# Patient Record
Sex: FEMALE | URBAN - METROPOLITAN AREA
[De-identification: names, ages, dates, MRNs, and addresses within clinical notes are randomized per-mention and may not be internally consistent; named-entity substitution may affect disease eponyms.]

---

## 2024-10-18 ENCOUNTER — TELEPHONE (OUTPATIENT)
Dept: GASTROENTEROLOGY | Facility: CLINIC | Age: 79
End: 2024-10-18
Payer: MEDICARE

## 2024-10-18 NOTE — TELEPHONE ENCOUNTER
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 Referral/records are scanned in media mgr. Please contact patient to schedule.         Thanks

## 2024-10-21 ENCOUNTER — TELEPHONE (OUTPATIENT)
Dept: ENDOSCOPY | Facility: HOSPITAL | Age: 79
End: 2024-10-21
Payer: MEDICARE

## 2024-10-21 ENCOUNTER — TELEPHONE (OUTPATIENT)
Dept: GASTROENTEROLOGY | Facility: CLINIC | Age: 79
End: 2024-10-21
Payer: MEDICARE

## 2024-10-21 NOTE — TELEPHONE ENCOUNTER
----- Message from Jennifer sent at 10/21/2024 12:14 PM CDT -----  Regarding: Appointments        Name Of Caller:    Alisson PETE      Contact Preference:   [phone]      Nature of call:  Requesting the office to contact pt's daughter, Amy Lawton about scheduling their NP appt. Please call phone #: [phone].

## 2024-10-25 ENCOUNTER — TELEPHONE (OUTPATIENT)
Dept: GASTROENTEROLOGY | Facility: CLINIC | Age: 79
End: 2024-10-25
Payer: MEDICARE

## 2024-10-25 NOTE — TELEPHONE ENCOUNTER
----- Message from Birdie sent at 10/25/2024  9:04 AM CDT -----  Regarding: Consult/Advisory  Contact: Alisson @ [phone]  Consult/Advisory     Name Of Caller: Alisson (Dr. Gannon)      Contact Preference: [phone]     Nature of call:calling to get office notes once pt is seen.

## 2024-10-28 ENCOUNTER — TELEPHONE (OUTPATIENT)
Dept: ENDOSCOPY | Facility: HOSPITAL | Age: 79
End: 2024-10-28
Payer: MEDICARE

## 2024-11-05 ENCOUNTER — TELEPHONE (OUTPATIENT)
Dept: GASTROENTEROLOGY | Facility: CLINIC | Age: 79
End: 2024-11-05
Payer: MEDICARE

## 2024-11-05 NOTE — TELEPHONE ENCOUNTER
----- Message from Missael Jamison PA-C sent at 11/4/2024  3:12 PM CST -----  Yes, I am ok with seeing EMR patients  ----- Message -----  From: Val Cline MD  Sent: 11/4/2024   3:02 PM CST  To: Georgi Chopra MA; Missael Jamison PA-C; #    Could be ok, Missael have you been comfortable reviewing EMRs with patients? Main things to emphasize in the duodenal location is the thin and vascular small bowel wall so bleeding and perforation are of slightly higher concern though overall fairly low risk (just higher risk than the usual low risk with a non-interventional procedure).  ----- Message -----  From: Georgi Chopra MA  Sent: 11/1/2024   8:25 AM CST  To: Val Cline MD; #     Joy Renae MA     Could this be with Missael?        Per Dr. Cline review, Clinic visit to discuss duodenal adenoma EMR in next 2-3 weeks.  Message forward to [facility]astro Clinical Staff for scheduling.

## 2024-11-07 ENCOUNTER — TELEPHONE (OUTPATIENT)
Dept: GASTROENTEROLOGY | Facility: CLINIC | Age: 79
End: 2024-11-07
Payer: MEDICARE

## 2024-11-07 NOTE — TELEPHONE ENCOUNTER
Spoke with Amy.  She will sent up [facility] and will call back to schedule virtual visit with GAEL Sorensen.

## 2024-11-07 NOTE — TELEPHONE ENCOUNTER
----- Message from Lance sent at 11/7/2024  8:08 AM CST -----  Regarding: Callback  Contact: Amy/daughter [phone]  Patient's daughter Amy calling requesting a callback from Joy Renae MA in the office in regards to questions about the consult that was discussed with her mother. Please call back as soon as possible.

## 2024-11-11 ENCOUNTER — TELEPHONE (OUTPATIENT)
Dept: GASTROENTEROLOGY | Facility: CLINIC | Age: 79
End: 2024-11-11
Payer: MEDICARE

## 2024-11-11 NOTE — TELEPHONE ENCOUNTER
----- Message from Svitlana sent at 11/11/2024  3:45 PM CST -----  Pt daughter calling to schedule a virtual ab for pt , says pt needs a procedure done and has to have her virtual before it can be scheduled      Call back [phone] Amy daughter

## 2024-11-13 ENCOUNTER — OFFICE VISIT (OUTPATIENT)
Dept: GASTROENTEROLOGY | Facility: CLINIC | Age: 79
End: 2024-11-13
Payer: MEDICARE

## 2024-11-13 DIAGNOSIS — D13.2 DUODENAL ADENOMA: Primary | ICD-10-CM

## 2024-11-13 PROCEDURE — 99213 OFFICE O/P EST LOW 20 MIN: CPT | Mod: 95,,,

## 2024-11-13 NOTE — PROGRESS NOTES
[facility]  The patient location is: [place]  The chief complaint leading to consultation is: EMR discussion  Visit type: Virtual visit with synchronous audio and video  Total time spent with patient: 20    Each patient to whom he or she provides medical services by telemedicine is:  (1) informed of the relationship between the physician and patient and the respective role of any other health care provider with respect to management of the patient; and (2) notified that he or she may decline to receive medical services by telemedicine and may withdraw from such care at any time.       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    PCP:   Anne Jamison.         Initial HPI   This is a 79 y.o. female presenting for discussion of EMR of duodenal polyp. She underwent EGD in [place] on 10/11/2024 which demonstrated a 2cm polyp in the second part of the duodenum located along a fold. Biopsies were taken and pathology showed it to be an adenoma. She was referred to [facility] for endoscopic mucosal resection of this polyp. She is on virtual visit today to discuss the risks/benefits of this procedure and have any questions answered . Denies NSAID use. Not on blood thinners        ROS:  Review of Systems   Constitutional:  Negative for chills, diaphoresis, fever and weight loss.   Gastrointestinal:  Negative for abdominal pain, blood in stool, constipation, diarrhea, heartburn, melena, nausea and vomiting.   Skin:  Negative for itching and rash.   Neurological:  Negative for dizziness and weakness.        Medical History: Anxiety disorder, colon polyps, Hypertension    Surgical History: Csection- 1984, Tonsillectomy    Family History: family history is not on file..       Allergies: NKDA    Medications:   Escitalopram oxalate 20mg  Hydroxyzine HCl 10mg  Levothyroxine 88mcg  Lisinopril 5mg   Pantoprazole 40mg  Triamcinolone acetonide 0.1%    No current outpatient medications on  "file prior to visit.     No current facility-administered medications on file prior to visit.         Objective Findings: (Limited due to virtual nature of encounter)    Physical Exam:  Physical Exam  Constitutional:       General: She is not in acute distress.     Appearance: Normal appearance. She is normal weight. She is not ill-appearing.   Eyes:      General: No scleral icterus.  Skin:     Coloration: Skin is not jaundiced.   Neurological:      Mental Status: She is alert and oriented to person, place, and time.             Labs:  No results found for: "WBC", "HGB", "HCT", "PLT", "CRP", "CHOL", "TRIG", "HDL", "LDLDIRECT", "ALKPHOS", "LIPASE", "ALT", "AST", "NA", "K", "CL", "CREATININE", "BUN", "CO2", "TSH", "PSA", "INR", "GLUF", "HGBA1C", "MICROALBUR"    Imaging reviewed:  N/A    Endoscopy reviewed:    EGD 10/11/2024  Impression:  Normal esophagus  Small hiatal hernia  Multiple polyps that were seen in the gastric body. We biopsied the largest  Mild gastritis. Biopsies were taken  A 2cm polyp that was seen in the second part of the duodenum, it was along a fold. This was biopsied    Assessment:  1. Duodenal adenoma             Recommendations:  We discussed why an EMR is the best method of removing this precancerous polyp and educated the patient that the procedure is done by creating a submucosal fluid cushion, removing the polyp, and controlling any bleeding that may occur.   We also discussed the risks of  EMR including intra procedural bleeding, about a 5-10% chance of post procedural bleeding, perforation (1%), and small chance of stricture development  Patient verbalized understanding of these risks  We went over return precautions after the procedure such as significant blood in stool, significant abdominal pain or fever. Discussed that she should be seen in the ED if any of these symptoms arise  Lastly we discussed the post procedure surveillance that will be needed depending on the findings of the EGD " and the success of polyp removal during EMR. The patient understands that she may need to come back for repeat EGD a few times to make sure that all polyp tissue is removed  I answered any questions the patient had regarding the procedure   Patient understands the risks/benefits of this procedure and wishes to proceed with EMR- will arrange for EGD with EMR in the next few weeks; She is not on blood thinners           Thank you so much for allowing me to participate in the care of Lesley Jamison PA-C  Advanced Endoscopy Physician Assitant  [facility]

## 2024-11-18 ENCOUNTER — TELEPHONE (OUTPATIENT)
Dept: ENDOSCOPY | Facility: HOSPITAL | Age: 79
End: 2024-11-18
Payer: MEDICARE

## 2024-11-18 DIAGNOSIS — K31.7 POLYP OF DUODENUM: Primary | ICD-10-CM

## 2024-11-18 NOTE — TELEPHONE ENCOUNTER
Referral for procedure from [facility]      Spoke to patient to schedule procedure(s) Upper Endoscopy (EGD)/EMR       Physician to perform procedure(s) Dr. TRENTON Cline  Date of Procedure (s) 01/09/2025  Arrival Time 07:30 AM  Time of Procedure(s) 08:30 AM   Location of Procedure(s) [address]  Type of Rx Prep sent to patient: N/A  Instructions provided to patient via [facility]    Patient verified that if she begins any medications before her scheduled procedure she will call to discuss hold time and number provided.     Patient was informed on the following information and verbalized understanding. Screening questionnaire reviewed with patient and complete. If procedure requires anesthesia, a responsible adult needs to be present to accompany the patient home, patient cannot drive after receiving anesthesia. Appointment details are tentative, especially check-in time. Patient will receive a prep-op call 7 days prior to confirm check-in time for procedure. If applicable the patient should contact their pharmacy to verify Rx for procedure prep is ready for pick-up. Patient was advised to call the scheduling department at [phone] if pharmacy states no Rx is available. Patient was advised to call the endoscopy scheduling department if any questions or concerns arise.      [facility] Scheduling Department

## 2024-11-26 ENCOUNTER — PATIENT MESSAGE (OUTPATIENT)
Dept: GASTROENTEROLOGY | Facility: CLINIC | Age: 79
End: 2024-11-26
Payer: MEDICARE

## 2025-01-02 ENCOUNTER — PATIENT MESSAGE (OUTPATIENT)
Dept: ENDOSCOPY | Facility: HOSPITAL | Age: 80
End: 2025-01-02
Payer: MEDICARE

## 2025-01-09 ENCOUNTER — ANESTHESIA EVENT (OUTPATIENT)
Dept: ENDOSCOPY | Facility: HOSPITAL | Age: 80
End: 2025-01-09
Payer: MEDICARE

## 2025-01-09 ENCOUNTER — ANESTHESIA (OUTPATIENT)
Dept: ENDOSCOPY | Facility: HOSPITAL | Age: 80
End: 2025-01-09
Payer: MEDICARE

## 2025-01-09 ENCOUNTER — HOSPITAL ENCOUNTER (OUTPATIENT)
Facility: HOSPITAL | Age: 80
Discharge: HOME | End: 2025-01-09
Attending: INTERNAL MEDICINE | Admitting: INTERNAL MEDICINE
Payer: MEDICARE

## 2025-01-09 VITALS
HEART RATE: 73 BPM | HEIGHT: 65 IN | SYSTOLIC BLOOD PRESSURE: 107 MMHG | OXYGEN SATURATION: 98 % | WEIGHT: 135 LBS | BODY MASS INDEX: 22.49 KG/M2 | TEMPERATURE: 98 F | RESPIRATION RATE: 18 BRPM | DIASTOLIC BLOOD PRESSURE: 55 MMHG

## 2025-01-09 DIAGNOSIS — D13.2 DUODENAL ADENOMA: ICD-10-CM

## 2025-01-09 PROCEDURE — 25000003 PHARM REV CODE 250: Performed by: NURSE ANESTHETIST, CERTIFIED REGISTERED

## 2025-01-09 PROCEDURE — 27202867: Performed by: INTERNAL MEDICINE

## 2025-01-09 PROCEDURE — 43254 EGD ENDO MUCOSAL RESECTION: CPT | Performed by: INTERNAL MEDICINE

## 2025-01-09 PROCEDURE — 37000008 HC ANESTHESIA 1ST 15 MINUTES: Performed by: INTERNAL MEDICINE

## 2025-01-09 PROCEDURE — 63600175 PHARM REV CODE 636 W HCPCS: Performed by: NURSE ANESTHETIST, CERTIFIED REGISTERED

## 2025-01-09 PROCEDURE — 27201089 HC SNARE, DISP (ANY): Performed by: INTERNAL MEDICINE

## 2025-01-09 PROCEDURE — 27200997: Performed by: INTERNAL MEDICINE

## 2025-01-09 PROCEDURE — 63600175 PHARM REV CODE 636 W HCPCS: Performed by: INTERNAL MEDICINE

## 2025-01-09 PROCEDURE — 88305 TISSUE EXAM BY PATHOLOGIST: CPT | Mod: 26,,, | Performed by: STUDENT IN AN ORGANIZED HEALTH CARE EDUCATION/TRAINING PROGRAM

## 2025-01-09 PROCEDURE — 43254 EGD ENDO MUCOSAL RESECTION: CPT | Mod: 22,,, | Performed by: INTERNAL MEDICINE

## 2025-01-09 PROCEDURE — 27202087 HC PROBE, APC: Performed by: INTERNAL MEDICINE

## 2025-01-09 PROCEDURE — 27201028 HC NEEDLE, SCLERO: Performed by: INTERNAL MEDICINE

## 2025-01-09 PROCEDURE — 27202906: Performed by: INTERNAL MEDICINE

## 2025-01-09 PROCEDURE — 27202363 HC INJECTION AGENT, SUBMUCOSAL, ANY: Performed by: INTERNAL MEDICINE

## 2025-01-09 PROCEDURE — 88305 TISSUE EXAM BY PATHOLOGIST: CPT | Performed by: STUDENT IN AN ORGANIZED HEALTH CARE EDUCATION/TRAINING PROGRAM

## 2025-01-09 PROCEDURE — 37000009 HC ANESTHESIA EA ADD 15 MINS: Performed by: INTERNAL MEDICINE

## 2025-01-09 RX ORDER — PANTOPRAZOLE SODIUM 20 MG/1
40 TABLET, DELAYED RELEASE ORAL
Qty: 180 TABLET | Refills: 3 | Status: SHIPPED | OUTPATIENT
Start: 2025-01-09 | End: 2025-07-08

## 2025-01-09 RX ORDER — PROCHLORPERAZINE EDISYLATE 5 MG/ML
5 INJECTION INTRAMUSCULAR; INTRAVENOUS EVERY 30 MIN PRN
Status: DISCONTINUED | OUTPATIENT
Start: 2025-01-09 | End: 2025-01-09 | Stop reason: HOSPADM

## 2025-01-09 RX ORDER — PROPOFOL 10 MG/ML
VIAL (ML) INTRAVENOUS
Status: DISCONTINUED | OUTPATIENT
Start: 2025-01-09 | End: 2025-01-09

## 2025-01-09 RX ORDER — PANTOPRAZOLE SODIUM 20 MG/1
20 TABLET, DELAYED RELEASE ORAL DAILY
Status: ON HOLD | COMMUNITY
End: 2025-01-09 | Stop reason: SDUPTHER

## 2025-01-09 RX ORDER — ESCITALOPRAM OXALATE 20 MG/1
20 TABLET ORAL DAILY
COMMUNITY

## 2025-01-09 RX ORDER — THYROID, PORCINE 300 MG/1
TABLET ORAL
COMMUNITY

## 2025-01-09 RX ORDER — GLUCAGON 1 MG
1 KIT INJECTION
Status: DISCONTINUED | OUTPATIENT
Start: 2025-01-09 | End: 2025-01-09 | Stop reason: HOSPADM

## 2025-01-09 RX ORDER — SODIUM CHLORIDE 0.9 % (FLUSH) 0.9 %
10 SYRINGE (ML) INJECTION
Status: DISCONTINUED | OUTPATIENT
Start: 2025-01-09 | End: 2025-01-09 | Stop reason: HOSPADM

## 2025-01-09 RX ORDER — ONDANSETRON HYDROCHLORIDE 2 MG/ML
INJECTION, SOLUTION INTRAVENOUS
Status: DISCONTINUED | OUTPATIENT
Start: 2025-01-09 | End: 2025-01-09

## 2025-01-09 RX ORDER — HYDROCHLOROTHIAZIDE 12.5 MG/1
12.5 TABLET ORAL DAILY
COMMUNITY

## 2025-01-09 RX ORDER — DEXAMETHASONE SODIUM PHOSPHATE 4 MG/ML
INJECTION, SOLUTION INTRA-ARTICULAR; INTRALESIONAL; INTRAMUSCULAR; INTRAVENOUS; SOFT TISSUE
Status: DISCONTINUED | OUTPATIENT
Start: 2025-01-09 | End: 2025-01-09

## 2025-01-09 RX ORDER — SODIUM CHLORIDE 9 MG/ML
INJECTION, SOLUTION INTRAVENOUS CONTINUOUS
Status: DISCONTINUED | OUTPATIENT
Start: 2025-01-09 | End: 2025-01-09 | Stop reason: HOSPADM

## 2025-01-09 RX ORDER — LIDOCAINE HYDROCHLORIDE 20 MG/ML
INJECTION INTRAVENOUS
Status: DISCONTINUED | OUTPATIENT
Start: 2025-01-09 | End: 2025-01-09

## 2025-01-09 RX ORDER — EPINEPHRINE 0.1 MG/ML
INJECTION INTRAVENOUS
Status: COMPLETED | OUTPATIENT
Start: 2025-01-09 | End: 2025-01-09

## 2025-01-09 RX ORDER — FENTANYL CITRATE 50 UG/ML
INJECTION, SOLUTION INTRAMUSCULAR; INTRAVENOUS
Status: DISCONTINUED | OUTPATIENT
Start: 2025-01-09 | End: 2025-01-09

## 2025-01-09 RX ORDER — HYDROXYZINE HYDROCHLORIDE 25 MG/1
12.5 TABLET, FILM COATED ORAL 3 TIMES DAILY PRN
COMMUNITY

## 2025-01-09 RX ORDER — ONDANSETRON HYDROCHLORIDE 2 MG/ML
4 INJECTION, SOLUTION INTRAVENOUS DAILY PRN
Status: DISCONTINUED | OUTPATIENT
Start: 2025-01-09 | End: 2025-01-09 | Stop reason: HOSPADM

## 2025-01-09 RX ORDER — ROCURONIUM BROMIDE 10 MG/ML
INJECTION, SOLUTION INTRAVENOUS
Status: DISCONTINUED | OUTPATIENT
Start: 2025-01-09 | End: 2025-01-09

## 2025-01-09 RX ORDER — EPHEDRINE SULFATE 50 MG/ML
INJECTION, SOLUTION INTRAVENOUS
Status: DISCONTINUED | OUTPATIENT
Start: 2025-01-09 | End: 2025-01-09

## 2025-01-09 RX ADMIN — ROCURONIUM BROMIDE 50 MG: 10 INJECTION INTRAVENOUS at 08:01

## 2025-01-09 RX ADMIN — FENTANYL CITRATE 50 MCG: 50 INJECTION, SOLUTION INTRAMUSCULAR; INTRAVENOUS at 10:01

## 2025-01-09 RX ADMIN — PROPOFOL 50 MG: 10 INJECTION, EMULSION INTRAVENOUS at 08:01

## 2025-01-09 RX ADMIN — EPHEDRINE SULFATE 10 MG: 50 INJECTION INTRAVENOUS at 09:01

## 2025-01-09 RX ADMIN — FENTANYL CITRATE 50 MCG: 50 INJECTION, SOLUTION INTRAMUSCULAR; INTRAVENOUS at 08:01

## 2025-01-09 RX ADMIN — DEXAMETHASONE SODIUM PHOSPHATE 4 MG: 4 INJECTION, SOLUTION INTRAMUSCULAR; INTRAVENOUS at 08:01

## 2025-01-09 RX ADMIN — PROPOFOL 100 MG: 10 INJECTION, EMULSION INTRAVENOUS at 08:01

## 2025-01-09 RX ADMIN — LIDOCAINE HYDROCHLORIDE 100 MG: 20 INJECTION INTRAVENOUS at 08:01

## 2025-01-09 RX ADMIN — ONDANSETRON 4 MG: 2 INJECTION INTRAMUSCULAR; INTRAVENOUS at 08:01

## 2025-01-09 RX ADMIN — SUGAMMADEX 200 MG: 100 INJECTION, SOLUTION INTRAVENOUS at 09:01

## 2025-01-09 NOTE — ANESTHESIA PROCEDURE NOTES
Intubation    Date/Time: 1/9/2025 8:45 AM    Performed by: Josh Melton CRNA  Authorized by: Louis Gordon MD    Intubation:     Induction:  Intravenous    Intubated:  Postinduction    Mask Ventilation:  Easy with oral airway    Attempts:  1    Attempted By:  CRNA    Method of Intubation:  Video laryngoscopy    Blade:  Tamayo 3    Laryngeal View Grade: Grade I - full view of cords      Difficult Airway Encountered?: No      Complications:  None    Airway Device:  Oral endotracheal tube    Airway Device Size:  7.0    Style/Cuff Inflation:  Cuffed    Tube secured:  21    Secured at:  The lips    Placement Verified By:  Capnometry    Complicating Factors:  Small mouth and anterior larynx    Findings Post-Intubation:  BS equal bilateral and atraumatic/condition of teeth unchanged

## 2025-01-09 NOTE — ANESTHESIA PREPROCEDURE EVALUATION
"                                                                                                             01/09/2025  Lesley Barakat is a 79 y.o., female.    Pre-operative evaluation for Procedure(s) (LRB):  EGD, WITH ENDOSCOPIC MUCOSAL RESECTION (N/A)    Lesley Barakat is a 79 y.o. female     There is no problem list on file for this patient.      Review of patient's allergies indicates:  Not on File    No current facility-administered medications on file prior to encounter.     No current outpatient medications on file prior to encounter.       No past surgical history on file.    Social History     Socioeconomic History    Marital status:      Social Drivers of Health     Financial Resource Strain: Low Risk  (11/12/2024)    Overall Financial Resource Strain (CARDIA)     Difficulty of Paying Living Expenses: Not hard at all   Food Insecurity: No Food Insecurity (1/6/2025)    Received from [employer] Vital Sign     Worried About Running Out of Food in the Last Year: Never true     Ran Out of Food in the Last Year: Never true   Physical Activity: Insufficiently Active (11/12/2024)    Exercise Vital Sign     Days of Exercise per Week: 2 days     Minutes of Exercise per Session: 20 min   Stress: No Stress Concern Present (11/12/2024)    [employer] - Occupational Stress Questionnaire     Feeling of Stress : Only a little   Housing Stability: Unknown (11/12/2024)    Housing Stability Vital Sign     Unable to Pay for Housing in the Last Year: No         CBC: No results for input(s): "WBC", "RBC", "HGB", "HCT", "PLT", "MCV", "MCH", "MCHC" in the last 72 hours.    CMP: No results for input(s): "NA", "K", "CL", "CO2", "BUN", "CREATININE", "GLU", "MG", "PHOS", "CALCIUM", "ALBUMIN", "PROT", "ALKPHOS", "ALT", "AST", "BILITOT" in the last 72 hours.    INR  No results for input(s): "PT", "INR", "PROTIME", "APTT" in the last 72 hours.        Diagnostic " Studies:      EKD Echo:  No results found for this or any previous visit.        Pre-op Assessment    I have reviewed the Patient Summary Reports.     I have reviewed the Nursing Notes. I have reviewed the NPO Status.   I have reviewed the Medications.     Review of Systems  Anesthesia Hx:  No problems with previous Anesthesia                Social:  Alcohol Use       Cardiovascular:  Exercise tolerance: good   Hypertension                                          Hepatic/GI:     GERD, well controlled                    Physical Exam  General: Well nourished, Cooperative and Alert    Airway:  Mallampati: II   Mouth Opening: Normal  TM Distance: Normal  Tongue: Normal  Neck ROM: Normal ROM    Dental:  Intact    Chest/Lungs:  Normal Respiratory Rate    Heart:  Rate: Normal        Anesthesia Plan  Type of Anesthesia, risks & benefits discussed:    Anesthesia Type: Gen ETT  Intra-op Monitoring Plan: Standard ASA Monitors  Post Op Pain Control Plan: multimodal analgesia and IV/PO Opioids PRN  Induction:  IV  Airway Plan: Direct, Post-Induction  Informed Consent: Informed consent signed with the Patient and all parties understand the risks and agree with anesthesia plan.  All questions answered.   ASA Score: 2  Day of Surgery Review of History & Physical: H&P Update referred to the surgeon/provider.    Ready For Surgery From Anesthesia Perspective.     .

## 2025-01-09 NOTE — TRANSFER OF CARE
"Anesthesia Transfer of Care Note    Patient: Lesley Barakat    Procedure(s) Performed: Procedure(s) (LRB):  EGD, WITH ENDOSCOPIC MUCOSAL RESECTION (N/A)    Patient location: [facility]    Anesthesia Type: general    Transport from OR: Transported from OR on 6-10 L/min O2 by face mask with adequate spontaneous ventilation    Post pain: adequate analgesia    Post assessment: no apparent anesthetic complications and tolerated procedure well    Post vital signs: stable    Level of consciousness: awake and alert    Complications: none    Transfer of care protocol was followed    Last vitals: Visit Vitals  /60 (BP Location: Left arm, Patient Position: Lying)   Pulse 76   Temp 36.6 °C (97.9 °F) (Temporal)   Resp 18   Ht 5' 5" (1.651 m)   Wt 61.2 kg (135 lb)   SpO2 96%   Breastfeeding No   BMI 22.47 kg/m²     "

## 2025-01-09 NOTE — PROVATION PATIENT INSTRUCTIONS
Discharge Summary/Instructions after an Endoscopic Procedure  Patient Name: Lesley Barakat  Patient MRN: [id number]  Patient YOB: 1945  Thursday, January 9, 2025  Val Cline MD  Dear patient,  As a result of recent federal legislation (The Federal Cures Act), you may   receive lab or pathology results from your procedure in your [facility]   account before your physician is able to contact you. Your physician or   their representative will relay the results to you with their   recommendations at their soonest availability.  Thank you,  RESTRICTIONS:  During your procedure today, you received medications for sedation.  These   medications may affect your judgment, balance and coordination.  Therefore,   for 24 hours, you have the following restrictions:   - DO NOT drive a car, operate machinery, make legal/financial decisions,   sign important papers or drink alcohol.    ACTIVITY:  Today: no heavy lifting, straining or running due to procedural   sedation/anesthesia.  The following day: return to full activity including work.  DIET:  Eat and drink normally unless instructed otherwise.     TREATMENT FOR COMMON SIDE EFFECTS:  - Mild abdominal pain, nausea, belching, bloating or excessive gas:  rest,   eat lightly and use a heating pad.  - Sore Throat: treat with throat lozenges and/or gargle with warm salt   water.  - Because air was used during the procedure, expelling large amounts of air   from your rectum or belching is normal.  - If a bowel prep was taken, you may not have a bowel movement for 1-3 days.    This is normal.  SYMPTOMS TO WATCH FOR AND REPORT TO YOUR PHYSICIAN:  1. Abdominal pain or bloating, other than gas cramps.  2. Chest pain.  3. Back pain.  4. Signs of infection such as: chills or fever occurring within 24 hours   after the procedure.  5. Rectal bleeding, which would show as bright red, maroon, or black stools.   (A tablespoon of blood from the rectum is not serious, especially if    hemorrhoids are present.)  6. Vomiting.  7. Weakness or dizziness.  GO DIRECTLY TO THE NEAREST EMERGENCY ROOM IF YOU HAVE ANY OF THE FOLLOWING:      Difficulty breathing              Chills and/or fever over 101 F   Persistent vomiting and/or vomiting blood   Severe abdominal pain   Severe chest pain   Black, tarry stools   Bleeding- more than one tablespoon   Any other symptom or condition that you feel may need urgent attention  Your doctor recommends these additional instructions:  If any biopsies were taken, your doctors clinic will contact you in 1 to 2   weeks with any results.  - Patient has a contact number available for emergencies.  The signs and   symptoms of potential delayed complications were discussed with the   patient.  Return to normal activities tomorrow.  Written discharge   instructions were provided to the patient.   - Discharge patient to home (ambulatory).   - Clear liquid diet today, then advance as tolerated to full liquid diet for   1 day.   - Continue present medications.   - Await pathology results.   - Repeat upper endoscopy in 4-6 months (with myself, main campus only,   75-90min session) for surveillance based on pathology results and for   possible further complex EMR/resection/ablation therapies pending exam.   - Return to referring physician.  For questions, problems or results please call your physician - Val Cline MD at Work:  [phone].  [facility], EMERGENCY ROOM PHONE NUMBER: [phone]  IF A COMPLICATION OR EMERGENCY SITUATION ARISES AND YOU ARE UNABLE TO REACH   YOUR PHYSICIAN - GO DIRECTLY TO THE EMERGENCY ROOM.  Val Cline MD  1/9/2025 10:30:46 AM  This report has been verified and signed electronically.  Dear patient,  As a result of recent federal legislation (The Federal Cures Act), you may   receive lab or pathology results from your procedure in your [facility]   account before your physician is able to contact you. Your physician or    their representative will relay the results to you with their   recommendations at their soonest availability.  Thank you,  PROVATION

## 2025-01-09 NOTE — H&P
"    Short Stay Endoscopy History and Physical    PCP - Anne Jamison MD  Referring Physician - Sepideh Carrion LPN  No address on file    Procedure - EGD  ASA - per anesthesia  Mallampati - per anesthesia  History of Anesthesia problems - per anesthesia  Family history Anesthesia problems -  per anesthesia   Plan of anesthesia - per anesthesia    HPI:  This is a 79 y.o. female here for evaluation of: EGD for management of a duodenal adenoma/polyp noted on outside EGD    Reflux - no  Dysphagia - no  Abdominal pain - no  Diarrhea - no    ROS:  Constitutional: No fevers, chills, No weight loss  CV: No chest pain  Pulm: No cough, No shortness of breath  Ophtho: No vision changes  GI: see HPI  Derm: No rash    Medical History:  has no past medical history on file.    Surgical History:  has no past surgical history on file.    Family History: family history is not on file..    Social History:      Review of patient's allergies indicates:  Not on File    Medications:   No medications prior to admission.       Physical Exam:    Vital Signs: There were no vitals filed for this visit.    General Appearance: Well appearing in no acute distress    Labs:  No results found for: "WBC", "HGB", "HCT", "PLT", "CHOL", "TRIG", "HDL", "LDLDIRECT", "ALT", "AST", "NA", "K", "CL", "CREATININE", "BUN", "CO2", "TSH", "PSA", "INR", "GLUF", "HGBA1C", "MICROALBUR"    I have explained the risks and benefits of this endoscopic procedure to the patient including but not limited to bleeding, inflammation, infection, perforation, missing a lesion and death.      Val Cline MD    "

## 2025-01-09 NOTE — ANESTHESIA POSTPROCEDURE EVALUATION
Anesthesia Post Evaluation    Patient: Lesley Barakat    Procedure(s) Performed: Procedure(s) (LRB):  EGD, WITH ENDOSCOPIC MUCOSAL RESECTION (N/A)    Final Anesthesia Type: general      Patient location during evaluation: [facility]  Patient participation: Yes- Able to Participate  Level of consciousness: awake and alert  Post-procedure vital signs: reviewed and stable  Pain management: adequate  Airway patency: patent  JEANNE mitigation strategies: Multimodal analgesia and Extubation while patient is awake  PONV status at discharge: No PONV  Anesthetic complications: no      Cardiovascular status: stable  Respiratory status: unassisted and spontaneous ventilation  Hydration status: euvolemic  Follow-up not needed.              Vitals Value Taken Time   /55 01/09/25 1101   Temp 36.6 °C (97.9 °F) 01/09/25 1100   Pulse 73 01/09/25 1101   Resp 18 01/09/25 1100   SpO2 98 % 01/09/25 1101   Vitals shown include unfiled device data.      No case tracking events are documented in the log.      Pain/Giuseppe Score: Giuseppe Score: 10 (1/9/2025 10:30 AM)

## 2025-01-14 LAB
FINAL PATHOLOGIC DIAGNOSIS: NORMAL
GROSS: NORMAL
Lab: NORMAL

## 2025-01-17 ENCOUNTER — TELEPHONE (OUTPATIENT)
Dept: GASTROENTEROLOGY | Facility: CLINIC | Age: 80
End: 2025-01-17
Payer: MEDICARE

## 2025-01-17 NOTE — TELEPHONE ENCOUNTER
----- Message from Payton sent at 1/17/2025  8:32 AM CST -----  Regarding: patient advice  Contact: Alisson [phone]  Alisson Smith calling to request recent clinical notes. hospitals call [phone]      Fax# [phone]

## 2025-01-28 ENCOUNTER — TELEPHONE (OUTPATIENT)
Dept: GASTROENTEROLOGY | Facility: CLINIC | Age: 80
End: 2025-01-28
Payer: MEDICARE

## 2025-01-28 NOTE — TELEPHONE ENCOUNTER
----- Message from Cady sent at 1/28/2025  3:32 PM CST -----  Regarding: needing chart notes  Contact: [phone]  Type:  Needs Medical Advice    Who Called: Chris calling from [facility]  Requesting chart notes from 1/9  Would the patient rather a call back or a response via [facility]? Call back   Best Call Back Number:[phone]  Fax [phone]

## 2025-02-19 ENCOUNTER — PATIENT MESSAGE (OUTPATIENT)
Dept: GASTROENTEROLOGY | Facility: CLINIC | Age: 80
End: 2025-02-19
Payer: MEDICARE

## 2025-04-11 ENCOUNTER — TELEPHONE (OUTPATIENT)
Dept: ENDOSCOPY | Facility: HOSPITAL | Age: 80
End: 2025-04-11
Payer: MEDICARE

## 2025-04-11 DIAGNOSIS — K31.7 POLYP OF DUODENUM: Primary | ICD-10-CM

## 2025-04-11 NOTE — TELEPHONE ENCOUNTER
Spoke to pt and her daughter, Sofía, to schedule procedure(s) Upper Endoscopy (EGD)/EMR       Physician to perform procedure(s) Dr. TRENTON Cline  Date of Procedure (s) 6/19/25  Arrival Time 7:00 AM  Time of Procedure(s) 8:00 AM   Location of Procedure(s) [address]  Type of Rx Prep sent to patient: N/A  Instructions provided to patient via [facility]    Patient was informed on the following information and verbalized understanding. Screening questionnaire reviewed with patient and complete. If procedure requires anesthesia, a responsible adult needs to be present to accompany the patient home, patient cannot drive after receiving anesthesia. Appointment details are tentative, especially check-in time. Patient will receive a prep-op call 7 days prior to confirm check-in time for procedure. If applicable the patient should contact their pharmacy to verify Rx for procedure prep is ready for pick-up. Patient was advised to call the scheduling department at [phone] if pharmacy states no Rx is available. Patient was advised to call the endoscopy scheduling department if any questions or concerns arise.      [facility] Scheduling Department

## 2025-04-11 NOTE — TELEPHONE ENCOUNTER
- Repeat upper endoscopy in 4-6 months (with                          myself, main campus only, 75-90min session) for                          surveillance based on pathology results and for                          possible further complex EMR/resection/ablation                          therapies pending exam.                          - Return to referring physician.   Attending Participation:        I personally performed the entire procedure.   Val Cline MD   1/9/2025 10:30:46 AM

## 2025-04-11 NOTE — TELEPHONE ENCOUNTER
Telephoned pt and pt's daughter to schedule EGD/EMR.  Left voicemail message with direct contact number for pt's daughter Amy.  Spoke with pt.  She states she needs to talk to her daughter regarding dates she is available and will call back to schedule.  Direct contact number provided.

## 2025-05-21 ENCOUNTER — OFFICE VISIT (OUTPATIENT)
Dept: GASTROENTEROLOGY | Facility: CLINIC | Age: 80
End: 2025-05-21
Payer: MEDICARE

## 2025-05-21 DIAGNOSIS — D13.2 DUODENAL ADENOMA: Primary | ICD-10-CM

## 2025-05-21 PROCEDURE — 98006 SYNCH AUDIO-VIDEO EST MOD 30: CPT | Mod: 95,,,

## 2025-05-21 NOTE — PROGRESS NOTES
[facility]  The patient location is: [place]  The chief complaint leading to consultation is: Adenomatous polyp of duodenum  Visit type: Virtual visit with synchronous audio and video  Total time spent with patient: 14 mins    Each patient to whom he or she provides medical services by telemedicine is:  (1) informed of the relationship between the physician and patient and the respective role of any other health care provider with respect to management of the patient; and (2) notified that he or she may decline to receive medical services by telemedicine and may withdraw from such care at any time.       Reason for Visit:  There were no encounter diagnoses.    PCP:   Anne Jamison         Initial HPI   This is a 79 y.o. female presenting for follow up after recent EMR for duodenal polyp. Patient initially underwent EGD in [place] on 10/11/2024 which demonstrated a 2cm polyp in the second part of the duodenum located along a fold. Biopsies were taken and pathology showed it to be an adenoma. She was then referred to [facility] and had EGD/EMR with Dr. lCine 1/9/25. Endoscopy demonstrated a single large approximately 22-24mm sessile/laterally spreading polyp with no bleeding found in the second portion of the duodenum, consistent with adenoma.  Only partial list of the lesion was achieved from the muscularis per prior (likely due to fibrosis/scarring from prior biopsy sampling which increased the complexity/challenge of the resection).  Piecemeal mucosal resection using both hot and cold snare as well as biopsy forceps was performed.  A large area was resected.  Resection and retrieval were complete.  There was no bleeding at the end of the maneuver.  Fulguration to ablate the lesion remnants by argon plasma was successful.  It was recommended she have repeat upper endoscopy in 4-6 months for surveillance based on pathology results and for possible further complex EMR/resection/ablation  therapies pending exam.  Pathology of the lesion revealed tubular adenoma with low-grade dysplasia.    Patient did very well after the procedure.  She did not have any symptoms or complications post procedure. Denies abdominal pain, melena, fever, weight loss, NV, early satiety. She is scheduled for repeat EGD  for potential repeat EMR/ablation attempts pending exam                 ROS:  Review of Systems   Constitutional:  Negative for chills, fever and weight loss.   Gastrointestinal:  Negative for abdominal pain, blood in stool, constipation, diarrhea, heartburn, melena, nausea and vomiting.   Skin:  Negative for itching and rash.        Medical History:  has a past medical history of GERD (gastroesophageal reflux disease).    Surgical History:  has a past surgical history that includes Colonoscopy; egd - external result; Tonsillectomy;  section; and Esophagogastroduodenoscopy (EGD) with endoscopic mucosal resection (N/A, 2025).    Family History: family history includes Heart attack in her father..       Review of patient's allergies indicates:  No Known Allergies    Medications Ordered Prior to Encounter[1]      Objective Findings: (Limited due to virtual nature of encounter)    Physical Exam:  Physical Exam  Constitutional:       General: She is not in acute distress.     Appearance: Normal appearance. She is not ill-appearing.   Eyes:      General: No scleral icterus.  Neurological:      Mental Status: She is alert and oriented to person, place, and time. Mental status is at baseline.             Labs:  Lab Results   Component Value Date    HGBA1C 5.0 10/27/2023       Endoscopy reviewed:    EGD 25  Findings:       The examined esophagus was normal.        The exam of the stomach was otherwise unremarkable.        A single large approximately 22-24mm sessile/laterally spreading        polyp with no bleeding was found in the second portion of the        duodenum, consistent with adenoma.  Preparations were made for        complex multimodal mucosal resection. Demarcation of the lesion was        performed to clearly identify boundaries of the lesion. A dilute 0.1        mg/mL solution of epinephrine as well as Ascendo were injected with        only partial lift of the lesion from the muscularis propria (likely        due to fibrosis/scarring from prior biopsy sampling, which increased        the complexity, challenge and difficulty of today's resection        attempt). Piecemeal mucosal resection using both a hot and cold        snare as well as biopsy forceps, with suction (via the working        channel) retrieval, was performed. A large area was resected.        Resection and retrieval were complete. There was no bleeding at the        end of the maneuver. Fulguration to ablate the lesion remnants by        argon plasma was successful. To reduce risk of bleeding after the        mucosal resection, hemostatic gel (PuraStat) was deployed over the        resection defect. Also to reduce risk of bleeding after mucosal        resection, two hemostatic clips were successfully placed, which only        partially closed the resection defect (due to nature overlying more        than 2 folds, etc, complete closure was not feasible). Estimated        blood loss was minimal.   Impression:            - Normal esophagus.                          - A single large 22-24mm duodenal adenoma was seen                          in second portion of duodenum on opposite wall                          from region of ampulla. Resected and retrieved                          with very complex and challenging multimodal                          endoscopic mucosal resection wtih partial lift of                          lesion (likely due to fibrosis/scarring from prior                          biopsy sampling, which increased the complexity,                          challenge and difficulty of today's resection                           attempt) requiring use of both cold and hot snare                          piecemeal resection, biopsy forceps used as well.                          Treated remnants/base/edges with argon plasma                          coagulation (APC) for fulguration. Hemostatic gel                          applied to resection defect to reduce risk of post                          mucosectomy bleeding. 2 clips were placed to also                          reduce risk of post mucosectomy bleeding, with                          only partial closure achieved (due to                          large/irregular shaped nature of resection defect                          across two folds).                          - Mucosal resection was performed. Resection and                          retrieval were complete.                          - Today's EGD for therapy of a large duodenal                          adenoma demonstrated increased complexity,                          challenge and risk requiring escalation of                          advanced resection techniques, excess time and use                          of multiple resources/tools to complete as                          detailed above (22 modifer).   Recommendation:        - Patient has a contact number available for                          emergencies. The signs and symptoms of potential                          delayed complications were discussed with the                          patient. Return to normal activities tomorrow.                          Written discharge instructions were provided to                          the patient.                          - Discharge patient to home (ambulatory).                          - Clear liquid diet today, then advance as                          tolerated to full liquid diet for 1 day.                          - Continue present medications.                          - Await pathology results.                          -  Repeat upper endoscopy in 4-6 months (with                          myself, main campus only, 75-90min session) for                          surveillance based on pathology results and for                          possible further complex EMR/resection/ablation                          therapies pending exam.                          - Return to referring physician.           Assessment:  No diagnosis found.     79 year old female presents after recent EMR in January for duodenal adenoma. The lesion could not be fully lifted during EMR and Dr. Cline subsequently performed a piecemeal resection with APC of residual tissue. She will be undergoing a repeat EGD 6/19 for surveillance and potentially repeat EMR/ablation pending the findings.     Recommendations:  Recommend she proceed with scheduled EGD/EMR 6/19 for surveillance of previously removed adenoma and potential repeat EMR/ablation attempt pending findings of EGD  We discussed the procedure in detail and I answered all of the patients questions  Went over the risks of the procedure including but not limited to the risk of anesthesia, bleeding, infection, perforation.  She verbalized understanding of these risks and would like to proceed with EGD  Also reviewed the return precautions after the procedure including significant abdominal pain, melena, fever etc          Thank you so much for allowing me to participate in the care of Lesley Jamison PA-C  Advanced Endoscopy Physician Assitant  [facility]                 [1]   Current Outpatient Medications on File Prior to Visit   Medication Sig Dispense Refill    EScitalopram oxalate (LEXAPRO) 20 MG tablet Take 20 mg by mouth once daily.      hydroCHLOROthiazide (HYDRODIURIL) 12.5 MG Tab Take 12.5 mg by mouth once daily.      hydrOXYzine HCL (ATARAX) 25 MG tablet Take 12.5 mg by mouth 3 (three) times daily as needed for Itching.      pantoprazole (PROTONIX) 20 MG tablet  Take 2 tablets (40 mg total) by mouth 2 (two) times daily before meals. Take twice daily 45min before breakfast and 45min before dinner 180 tablet 3    thyroid, pork, (ARMOUR THYROID) 300 mg Tab Take by mouth.       No current facility-administered medications on file prior to visit.

## 2025-06-18 RX ORDER — SODIUM CHLORIDE 0.9 % (FLUSH) 0.9 %
10 SYRINGE (ML) INJECTION
Status: CANCELLED | OUTPATIENT
Start: 2025-06-18

## 2025-06-18 RX ORDER — SODIUM CHLORIDE 9 MG/ML
INJECTION, SOLUTION INTRAVENOUS CONTINUOUS
Status: CANCELLED | OUTPATIENT
Start: 2025-06-18

## 2025-06-19 ENCOUNTER — ANESTHESIA EVENT (OUTPATIENT)
Dept: ENDOSCOPY | Facility: HOSPITAL | Age: 80
End: 2025-06-19
Payer: MEDICARE

## 2025-06-19 ENCOUNTER — ANESTHESIA (OUTPATIENT)
Dept: ENDOSCOPY | Facility: HOSPITAL | Age: 80
End: 2025-06-19
Payer: MEDICARE

## 2025-06-19 ENCOUNTER — HOSPITAL ENCOUNTER (OUTPATIENT)
Facility: HOSPITAL | Age: 80
Discharge: HOME | End: 2025-06-19
Attending: INTERNAL MEDICINE | Admitting: INTERNAL MEDICINE
Payer: MEDICARE

## 2025-06-19 VITALS
WEIGHT: 134 LBS | HEIGHT: 65 IN | RESPIRATION RATE: 18 BRPM | BODY MASS INDEX: 22.33 KG/M2 | OXYGEN SATURATION: 100 % | SYSTOLIC BLOOD PRESSURE: 142 MMHG | DIASTOLIC BLOOD PRESSURE: 64 MMHG | HEART RATE: 62 BPM | TEMPERATURE: 98 F

## 2025-06-19 DIAGNOSIS — D13.2 DUODENAL ADENOMA: ICD-10-CM

## 2025-06-19 DIAGNOSIS — K31.7 POLYP OF DUODENUM: ICD-10-CM

## 2025-06-19 PROCEDURE — 25000003 PHARM REV CODE 250

## 2025-06-19 PROCEDURE — 88305 TISSUE EXAM BY PATHOLOGIST: CPT | Mod: 26,,, | Performed by: PATHOLOGY

## 2025-06-19 PROCEDURE — 43239 EGD BIOPSY SINGLE/MULTIPLE: CPT | Mod: XS,,, | Performed by: INTERNAL MEDICINE

## 2025-06-19 PROCEDURE — 88305 TISSUE EXAM BY PATHOLOGIST: CPT | Mod: TC | Performed by: INTERNAL MEDICINE

## 2025-06-19 PROCEDURE — 37000008 HC ANESTHESIA 1ST 15 MINUTES: Performed by: INTERNAL MEDICINE

## 2025-06-19 PROCEDURE — 43251 EGD REMOVE LESION SNARE: CPT | Mod: XS | Performed by: INTERNAL MEDICINE

## 2025-06-19 PROCEDURE — 43239 EGD BIOPSY SINGLE/MULTIPLE: CPT | Mod: XS | Performed by: INTERNAL MEDICINE

## 2025-06-19 PROCEDURE — 27201012 HC FORCEPS, HOT/COLD, DISP: Performed by: INTERNAL MEDICINE

## 2025-06-19 PROCEDURE — 43270 EGD LESION ABLATION: CPT | Mod: ,,, | Performed by: INTERNAL MEDICINE

## 2025-06-19 PROCEDURE — 43270 EGD LESION ABLATION: CPT | Performed by: INTERNAL MEDICINE

## 2025-06-19 PROCEDURE — 27201089 HC SNARE, DISP (ANY): Performed by: INTERNAL MEDICINE

## 2025-06-19 PROCEDURE — 37000009 HC ANESTHESIA EA ADD 15 MINS: Performed by: INTERNAL MEDICINE

## 2025-06-19 PROCEDURE — 63600175 PHARM REV CODE 636 W HCPCS

## 2025-06-19 PROCEDURE — 43251 EGD REMOVE LESION SNARE: CPT | Mod: XS,,, | Performed by: INTERNAL MEDICINE

## 2025-06-19 RX ORDER — SODIUM CHLORIDE 9 MG/ML
INJECTION, SOLUTION INTRAVENOUS CONTINUOUS
Status: DISCONTINUED | OUTPATIENT
Start: 2025-06-19 | End: 2025-06-19 | Stop reason: HOSPADM

## 2025-06-19 RX ORDER — GLUCAGON 1 MG
1 KIT INJECTION
Status: DISCONTINUED | OUTPATIENT
Start: 2025-06-19 | End: 2025-06-19 | Stop reason: HOSPADM

## 2025-06-19 RX ORDER — LIDOCAINE HYDROCHLORIDE 20 MG/ML
INJECTION INTRAVENOUS
Status: DISCONTINUED | OUTPATIENT
Start: 2025-06-19 | End: 2025-06-19

## 2025-06-19 RX ORDER — PROPOFOL 10 MG/ML
VIAL (ML) INTRAVENOUS
Status: DISCONTINUED | OUTPATIENT
Start: 2025-06-19 | End: 2025-06-19

## 2025-06-19 RX ADMIN — SODIUM CHLORIDE: 9 INJECTION, SOLUTION INTRAVENOUS at 08:06

## 2025-06-19 RX ADMIN — LIDOCAINE HYDROCHLORIDE 100 MG: 20 INJECTION INTRAVENOUS at 08:06

## 2025-06-19 RX ADMIN — PROPOFOL 40 MG: 10 INJECTION, EMULSION INTRAVENOUS at 08:06

## 2025-06-19 RX ADMIN — PROPOFOL 30 MG: 10 INJECTION, EMULSION INTRAVENOUS at 08:06

## 2025-06-19 RX ADMIN — PROPOFOL 175 MCG/KG/MIN: 10 INJECTION, EMULSION INTRAVENOUS at 08:06

## 2025-06-19 NOTE — H&P
Short Stay Endoscopy History and Physical    PCP - Anne Jamison MD  Referring Physician - Val Cline MD  [address] [place] [address]    Procedure - EGD  ASA - per anesthesia  Mallampati - per anesthesia  History of Anesthesia problems - per anesthesia  Family history Anesthesia problems -  per anesthesia   Plan of anesthesia - per anesthesia    HPI:  This is a 79 y.o. female here for evaluation of: EGD for follow up after EMR of sizable duodenal adenoma done in 2025    Reflux - no  Dysphagia - no  Abdominal pain - no  Diarrhea - no    ROS:  Constitutional: No fevers, chills, No weight loss  CV: No chest pain  Pulm: No cough, No shortness of breath  Ophtho: No vision changes  GI: see HPI  Derm: No rash    Medical History:  has a past medical history of GERD (gastroesophageal reflux disease).    Surgical History:  has a past surgical history that includes Colonoscopy; egd - external result; Tonsillectomy;  section; and Esophagogastroduodenoscopy (EGD) with endoscopic mucosal resection (N/A, 2025).    Family History: family history includes Heart attack in her father..    Social History:  reports that she has never smoked. She has never used smokeless tobacco. She reports current alcohol use of about 7.0 standard drinks of alcohol per week. She reports that she does not use drugs.    Review of patient's allergies indicates:  No Known Allergies    Medications:   Prescriptions Prior to Admission[1]    Physical Exam:    Vital Signs:   Vitals:    25 0713   BP: (!) 181/73   Pulse: 80   Resp: 16   Temp: 97.3 °F (36.3 °C)       General Appearance: Well appearing in no acute distress    Labs:  Lab Results   Component Value Date    HGBA1C 5.0 10/27/2023       I have explained the risks and benefits of this endoscopic procedure to the patient including but not limited to bleeding, inflammation, infection, perforation, missing a lesion and death.      Val Cline MD          [1]   Medications Prior to Admission   Medication Sig Dispense Refill Last Dose/Taking    EScitalopram oxalate (LEXAPRO) 20 MG tablet Take 20 mg by mouth once daily.   6/18/2025    hydroCHLOROthiazide (HYDRODIURIL) 12.5 MG Tab Take 12.5 mg by mouth once daily.   6/19/2025 Morning    pantoprazole (PROTONIX) 20 MG tablet Take 2 tablets (40 mg total) by mouth 2 (two) times daily before meals. Take twice daily 45min before breakfast and 45min before dinner 180 tablet 3 6/18/2025    thyroid, pork, (ARMOUR THYROID) 300 mg Tab Take by mouth.   6/18/2025    hydrOXYzine HCL (ATARAX) 25 MG tablet Take 12.5 mg by mouth 3 (three) times daily as needed for Itching.

## 2025-06-19 NOTE — TRANSFER OF CARE
"Anesthesia Transfer of Care Note    Patient: Lesley Barakat    Procedure(s) Performed: Procedure(s) (LRB):  EGD (ESOPHAGOGASTRODUODENOSCOPY) (N/A)    Patient location: [facility] [place]    Anesthesia Type: general    Transport from OR: Transported from OR on 2-3 L/min O2 by NC with adequate spontaneous ventilation    Post pain: adequate analgesia    Post assessment: no apparent anesthetic complications and tolerated procedure well    Post vital signs: stable    Level of consciousness: awake and alert    Nausea/Vomiting: no nausea/vomiting    Complications: none    Transfer of care protocol was followed      Last vitals: Visit Vitals  BP (!) 181/73 (BP Location: Left arm, Patient Position: Lying)   Pulse 80   Temp 36.3 °C (97.3 °F)   Resp 16   Ht 5' 5" (1.651 m)   Wt 60.8 kg (134 lb)   SpO2 98%   Breastfeeding No   BMI 22.30 kg/m²     "

## 2025-06-19 NOTE — ANESTHESIA PREPROCEDURE EVALUATION
"       Pre-operative evaluation for Procedure(s) (LRB):  EGD (ESOPHAGOGASTRODUODENOSCOPY) (N/A)    Lesley Barakat is a 79 y.o. female w/ HTN and hx of duodenal adenoma s/p resection who presents for surveillance EGD,  possible intervention.        Prev airway (2025):     Induction:  Intravenous    Intubated:  Postinduction    Mask Ventilation:  Easy with oral airway    Attempts:  1    Attempted By:  CRNA    Method of Intubation:  Video laryngoscopy    Blade:  Tamayo 3    Laryngeal View Grade: Grade I - full view of cords      Difficult Airway Encountered?: No      Complications:  None    Airway Device:  Oral endotracheal tube    Airway Device Size:  7.0    Style/Cuff Inflation:  Cuffed    Tube secured:  21    Secured at:  The lips    Placement Verified By:  Capnometry    Complicating Factors:  Small mouth and anterior larynx    Findings Post-Intubation:  BS equal bilateral and atraumatic/condition of teeth unchanged      2D Echo: none on record      EKG: none on record        Problem List[1]    Review of patient's allergies indicates:  No Known Allergies    Past Surgical History:   Procedure Laterality Date     SECTION      COLONOSCOPY      EGD - EXTERNAL RESULT      ESOPHAGOGASTRODUODENOSCOPY (EGD) WITH ENDOSCOPIC MUCOSAL RESECTION N/A 2025    Procedure: EGD, WITH ENDOSCOPIC MUCOSAL RESECTION;  Surgeon: Val Cline MD;  Location: [address];  Service: Endoscopy;  Laterality: N/A;  : instructons sent via portal-2cm duodenal polyp EMR-[facility]  1/2-precall complete-Kpvt    TONSILLECTOMY           Vital Signs:         CBC: No results for input(s): "WBC", "RBC", "HGB", "HCT", "PLT", "MCV", "MCH", "MCHC" in the last 72 hours.    CMP: No results for input(s): "NA", "K", "CL", "CO2", "BUN", "CREATININE", "GLU", "MG", "PHOS", "CALCIUM", "ALBUMIN", "PROT", "ALKPHOS", "ALT", "AST", "BILITOT" in the last 72 hours.    INR  No results for input(s): "PT", "INR", "PROTIME", "APTT" in the last 72 " hours.          Pre-op Assessment    I have reviewed the Patient Summary Reports.     I have reviewed the Nursing Notes. I have reviewed the NPO Status.   I have reviewed the Medications.     Review of Systems  Anesthesia Hx:  No problems with previous Anesthesia             Denies Family Hx of Anesthesia complications.    Denies Personal Hx of Anesthesia complications.                    Hematology/Oncology:    Oncology Normal                                   Cardiovascular:  Exercise tolerance: good   Denies Pacemaker. Hypertension  Denies Valvular problems/Murmurs.   Denies CABG/stent.     Denies CHF.                                   Pulmonary:  Pulmonary Normal    Denies Asthma.     Denies Sleep Apnea.                Renal/:  Renal/ Normal                 Hepatic/GI:     GERD                Neurological:  Neurology Normal   Denies CVA.    Denies Seizures.                                Endocrine:  Endocrine Normal Denies Diabetes.               Physical Exam  General: Alert and Oriented    Airway:  Mallampati: II   Mouth Opening: Normal  TM Distance: Normal  Tongue: Normal    Dental:  Intact    Chest/Lungs:  Clear to auscultation, Normal Respiratory Rate    Heart:  Rate: Normal  Rhythm: Regular Rhythm        Anesthesia Plan  Type of Anesthesia, risks & benefits discussed:    Anesthesia Type: Gen Natural Airway  Intra-op Monitoring Plan: Standard ASA Monitors  Post Op Pain Control Plan: IV/PO Opioids PRN and multimodal analgesia  Induction:  IV  Informed Consent: Informed consent signed with the Patient and all parties understand the risks and agree with anesthesia plan.  All questions answered.   ASA Score: 2  Day of Surgery Review of History & Physical: H&P Update referred to the surgeon/provider.    Ready For Surgery From Anesthesia Perspective.     .           [1] There is no problem list on file for this patient.

## 2025-06-19 NOTE — ANESTHESIA POSTPROCEDURE EVALUATION
Anesthesia Post Evaluation    Patient: Lesley Barakat    Procedure(s) Performed: Procedure(s) (LRB):  EGD (ESOPHAGOGASTRODUODENOSCOPY) (N/A)    Final Anesthesia Type: general      Patient location during evaluation: PACU  Patient participation: Yes- Able to Participate  Level of consciousness: awake  Post-procedure vital signs: reviewed and stable  Pain management: adequate  Airway patency: patent    PONV status at discharge: No PONV  Anesthetic complications: no      Cardiovascular status: blood pressure returned to baseline  Respiratory status: unassisted, spontaneous ventilation and room air                Vitals Value Taken Time   /60 06/19/25 09:31   Temp 36.4 °C (97.5 °F) 06/19/25 09:02   Pulse 59 06/19/25 09:45   Resp 23 06/19/25 09:45   SpO2 100 % 06/19/25 09:45   Vitals shown include unfiled device data.      No case tracking events are documented in the log.      Pain/Giuseppe Score: Giuseppe Score: 10 (6/19/2025  9:20 AM)

## 2025-06-19 NOTE — PROVATION PATIENT INSTRUCTIONS
Discharge Summary/Instructions after an Endoscopic Procedure  Patient Name: Lesley Barakat  Patient MRN: [id number]  Patient YOB: 1945  Thursday, June 19, 2025  Val Cline MD  Dear patient,  As a result of recent federal legislation (The Federal Cures Act), you may   receive lab or pathology results from your procedure in your [facility]   account before your physician is able to contact you. Your physician or   their representative will relay the results to you with their   recommendations at their soonest availability.  Thank you,  RESTRICTIONS:  During your procedure today, you received medications for sedation.  These   medications may affect your judgment, balance and coordination.  Therefore,   for 24 hours, you have the following restrictions:   - DO NOT drive a car, operate machinery, make legal/financial decisions,   sign important papers or drink alcohol.    ACTIVITY:  Today: no heavy lifting, straining or running due to procedural   sedation/anesthesia.  The following day: return to full activity including work.  DIET:  Eat and drink normally unless instructed otherwise.     TREATMENT FOR COMMON SIDE EFFECTS:  - Mild abdominal pain, nausea, belching, bloating or excessive gas:  rest,   eat lightly and use a heating pad.  - Sore Throat: treat with throat lozenges and/or gargle with warm salt   water.  - Because air was used during the procedure, expelling large amounts of air   from your rectum or belching is normal.  - If a bowel prep was taken, you may not have a bowel movement for 1-3 days.    This is normal.  SYMPTOMS TO WATCH FOR AND REPORT TO YOUR PHYSICIAN:  1. Abdominal pain or bloating, other than gas cramps.  2. Chest pain.  3. Back pain.  4. Signs of infection such as: chills or fever occurring within 24 hours   after the procedure.  5. Rectal bleeding, which would show as bright red, maroon, or black stools.   (A tablespoon of blood from the rectum is not serious, especially if    hemorrhoids are present.)  6. Vomiting.  7. Weakness or dizziness.  GO DIRECTLY TO THE NEAREST EMERGENCY ROOM IF YOU HAVE ANY OF THE FOLLOWING:      Difficulty breathing              Chills and/or fever over 101 F   Persistent vomiting and/or vomiting blood   Severe abdominal pain   Severe chest pain   Black, tarry stools   Bleeding- more than one tablespoon   Any other symptom or condition that you feel may need urgent attention  Your doctor recommends these additional instructions:  If any biopsies were taken, your doctors clinic will contact you in 1 to 2   weeks with any results.  - Patient has a contact number available for emergencies.  The signs and   symptoms of potential delayed complications were discussed with the   patient.  Return to normal activities tomorrow.  Written discharge   instructions were provided to the patient.   - Discharge patient to home.   - Resume previous diet.   - Continue present medications.   - Await pathology results.   - Repeat upper endoscopy in 6-12 months for surveillance based on pathology   results; if no adenomatous mucosa noted on path can return in 12 months; if   adenomatous mucosa is present on path then return in 6 months for   surveillance/further possible resection/fulguration.   - Return to referring physician.  For questions, problems or results please call your physician - Val Cline MD at Work:  [phone].  [facility], EMERGENCY ROOM PHONE NUMBER: [phone]  IF A COMPLICATION OR EMERGENCY SITUATION ARISES AND YOU ARE UNABLE TO REACH   YOUR PHYSICIAN - GO DIRECTLY TO THE EMERGENCY ROOM.  Val Cline MD  6/19/2025 9:08:41 AM  This report has been verified and signed electronically.  Dear patient,  As a result of recent federal legislation (The Federal Cures Act), you may   receive lab or pathology results from your procedure in your [facility]   account before your physician is able to contact you. Your physician or   their  representative will relay the results to you with their   recommendations at their soonest availability.  Thank you,  PROVATION

## 2025-06-23 LAB
ESTROGEN SERPL-MCNC: NORMAL PG/ML
INSULIN SERPL-ACNC: NORMAL U[IU]/ML
LAB AP CLINICAL INFORMATION: NORMAL
LAB AP GROSS DESCRIPTION: NORMAL
LAB AP PERFORMING LOCATION(S): NORMAL
LAB AP REPORT FOOTNOTES: NORMAL